# Patient Record
Sex: MALE | Race: WHITE
[De-identification: names, ages, dates, MRNs, and addresses within clinical notes are randomized per-mention and may not be internally consistent; named-entity substitution may affect disease eponyms.]

---

## 2018-04-14 ENCOUNTER — HOSPITAL ENCOUNTER (EMERGENCY)
Dept: HOSPITAL 75 - ER | Age: 48
Discharge: HOME | End: 2018-04-14
Payer: COMMERCIAL

## 2018-04-14 VITALS — BODY MASS INDEX: 40.09 KG/M2 | HEIGHT: 70 IN | WEIGHT: 280 LBS

## 2018-04-14 VITALS — DIASTOLIC BLOOD PRESSURE: 82 MMHG | SYSTOLIC BLOOD PRESSURE: 135 MMHG

## 2018-04-14 DIAGNOSIS — N20.1: Primary | ICD-10-CM

## 2018-04-14 LAB
ALBUMIN SERPL-MCNC: 4.2 GM/DL (ref 3.2–4.5)
ALP SERPL-CCNC: 56 U/L (ref 40–136)
ALT SERPL-CCNC: 45 U/L (ref 0–55)
APTT PPP: YELLOW S
BACTERIA #/AREA URNS HPF: NEGATIVE /HPF
BASOPHILS # BLD AUTO: 0 10^3/UL (ref 0–0.1)
BASOPHILS NFR BLD AUTO: 0 % (ref 0–10)
BILIRUB SERPL-MCNC: 0.5 MG/DL (ref 0.1–1)
BILIRUB UR QL STRIP: NEGATIVE
BUN/CREAT SERPL: 12
CALCIUM SERPL-MCNC: 9.5 MG/DL (ref 8.5–10.1)
CHLORIDE SERPL-SCNC: 108 MMOL/L (ref 98–107)
CO2 SERPL-SCNC: 22 MMOL/L (ref 21–32)
CREAT SERPL-MCNC: 1.28 MG/DL (ref 0.6–1.3)
EOSINOPHIL # BLD AUTO: 0 10^3/UL (ref 0–0.3)
EOSINOPHIL NFR BLD AUTO: 0 % (ref 0–10)
ERYTHROCYTE [DISTWIDTH] IN BLOOD BY AUTOMATED COUNT: 14 % (ref 10–14.5)
FIBRINOGEN PPP-MCNC: (no result) MG/DL
GFR SERPLBLD BASED ON 1.73 SQ M-ARVRAT: > 60 ML/MIN
GLUCOSE SERPL-MCNC: 134 MG/DL (ref 70–105)
GLUCOSE UR STRIP-MCNC: NEGATIVE MG/DL
HCT VFR BLD CALC: 46 % (ref 40–54)
HGB BLD-MCNC: 16.1 G/DL (ref 13.3–17.7)
KETONES UR QL STRIP: NEGATIVE
LEUKOCYTE ESTERASE UR QL STRIP: NEGATIVE
LYMPHOCYTES # BLD AUTO: 1.1 X 10^3 (ref 1–4)
LYMPHOCYTES NFR BLD AUTO: 10 % (ref 12–44)
MANUAL DIFFERENTIAL PERFORMED BLD QL: NO
MCH RBC QN AUTO: 30 PG (ref 25–34)
MCHC RBC AUTO-ENTMCNC: 35 G/DL (ref 32–36)
MCV RBC AUTO: 84 FL (ref 80–99)
MONOCYTES # BLD AUTO: 0.6 X 10^3 (ref 0–1)
MONOCYTES NFR BLD AUTO: 5 % (ref 0–12)
NEUTROPHILS # BLD AUTO: 9.3 X 10^3 (ref 1.8–7.8)
NEUTROPHILS NFR BLD AUTO: 84 % (ref 42–75)
NITRITE UR QL STRIP: NEGATIVE
PH UR STRIP: 7 [PH] (ref 5–9)
PLATELET # BLD: 221 10^3/UL (ref 130–400)
PMV BLD AUTO: 9.8 FL (ref 7.4–10.4)
POTASSIUM SERPL-SCNC: 4.2 MMOL/L (ref 3.6–5)
PROT SERPL-MCNC: 7.6 GM/DL (ref 6.4–8.2)
PROT UR QL STRIP: (no result)
RBC # BLD AUTO: 5.46 10^6/UL (ref 4.35–5.85)
RBC #/AREA URNS HPF: (no result) /HPF
SODIUM SERPL-SCNC: 140 MMOL/L (ref 135–145)
SP GR UR STRIP: 1.01 (ref 1.02–1.02)
SQUAMOUS #/AREA URNS HPF: (no result) /HPF
UROBILINOGEN UR-MCNC: NORMAL MG/DL
WBC # BLD AUTO: 11.1 10^3/UL (ref 4.3–11)
WBC #/AREA URNS HPF: (no result) /HPF

## 2018-04-14 PROCEDURE — 96375 TX/PRO/DX INJ NEW DRUG ADDON: CPT

## 2018-04-14 PROCEDURE — 74177 CT ABD & PELVIS W/CONTRAST: CPT

## 2018-04-14 PROCEDURE — 85025 COMPLETE CBC W/AUTO DIFF WBC: CPT

## 2018-04-14 PROCEDURE — 96374 THER/PROPH/DIAG INJ IV PUSH: CPT

## 2018-04-14 PROCEDURE — 80053 COMPREHEN METABOLIC PANEL: CPT

## 2018-04-14 PROCEDURE — 81000 URINALYSIS NONAUTO W/SCOPE: CPT

## 2018-04-14 PROCEDURE — 36415 COLL VENOUS BLD VENIPUNCTURE: CPT

## 2018-04-14 NOTE — DIAGNOSTIC IMAGING REPORT
PROCEDURE: CT abdomen and pelvis with contrast.



TECHNIQUE: Multiple contiguous axial images were obtained through

the abdomen and pelvis after administration of intravenous

contrast. 



DATE: 04/14/2018.



COMPARISON: None. 



INDICATION: 48-year-old male, left-sided abdominal pain.



FINDINGS: 



There is a 3 mm noncalcified left lower lobe pulmonary nodule on

axial image 14. The heart is not enlarged. There is no

pericardial effusion.



The liver is diffusely low in attenuation consistent with mild

diffuse fatty infiltration of the liver. The outer liver contours

are not grossly nodular. There is no identified liver lesion. The

main, right, and left portal veins are patent. The gallbladder is

unremarkable. There is no intrahepatic or extrahepatic bile duct

dilation. The main pancreatic duct is not abnormally dilated. The

pancreatic parenchyma is unremarkable. The spleen is normal in

size. The adrenal glands are unremarkable.



There is a questionable 3 mm low-attenuation left renal lesion on

axial image 37 which is too small to characterize. There is mild

asymmetric left perinephric stranding. There is a 2 mm stone in

the left distal ureter at the ureterovesical junction on axial

image 89. There is no hydronephrosis. Urinary bladder is

underdistended. The right urinary collecting system is not

distended.



There is diverticulosis without evidence of acute diverticulitis.

There is no free intraperitoneal air. No drainable fluid

collection. There is no free pelvic fluid. There is a

fat-containing paraumbilical hernia. No evidence of acute

appendicitis.



There are atherosclerotic calcifications. There is no identified

abnormally enlarged lymph node in the abdomen or pelvis which

meets CT size criteria for adenopathy.



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 There is

grade 1/2 anterolisthesis of L5 on S1. There is moderate disc

height loss at L5-S1. There are additional degenerative changes

of the spine.



IMPRESSION: 

CT ABDOMEN AND PELVIS.

1. 2 mm stone in the left distal ureter at the ureterovesical

junction without hydronephrosis.

2. Mild diffuse fatty infiltration of the liver.









Dictated by: 



  Dictated on workstation # [id number]

## 2018-04-14 NOTE — ED ABDOMINAL PAIN
General


Stated Complaint:  L SIDE ABD PAIN


Source of Information:  Patient


Exam Limitations:  No Limitations





History of Present Illness


Date Seen by Provider:  2018


Time Seen by Provider:  12:52


Initial Comments


To ER with c/o left lower abdominal pain that began this morning. Associated 

with nausea, no vomiting. No fevers or chills. No history of this. states pain 

began as "felt like I was going to have diarrhea" but he denies actually having 

diarrhea. no dysuria.


Timing/Duration:  1-2 Days


Severity/Quality:  Moderate


Radiation:  No Radiation


Activities at Onset:  None


Associated Symptoms:  Nausea/Vomiting





Allergies and Home Medications


Allergies


Coded Allergies:  


     No Known Drug Allergies (Unverified , 18)





Home Medications


Hydrocodone/Acetaminophen 1 Each Tablet, 1 EACH PO Q4H PRN for PAIN-SEVERE


   Prescribed by: CAL NIELSEN on 18 1258


Ondansetron 8 Mg Tab.rapdis, 8 MG PO Q4H PRN for NAUSEA/VOMITING-1ST LINE


   Prescribed by: CAL NIELSEN on 18 1258


Tamsulosin HCl 0.4 Mg Cap, 0.4 MG PO DAILY


   Prescribed by: CAL NIELSEN on 18 1320





Patient Home Medication List


Home Medication List Reviewed:  Yes





Review of Systems


Constitutional:  see HPI


EENTM:  No Symptoms Reported


Respiratory:  No Symptoms Reported


Cardiovascular:  No Symptoms Reported


Gastrointestinal:  See HPI, Abdominal Pain; Denies Constipated, Denies Diarrhea

; Nausea


Genitourinary:  No Symptoms Reported; Denies Burning, Denies Discharge, Denies 

Flank Pain, Denies Hematuria


Musculoskeletal:  no symptoms reported


Skin:  no symptoms reported


Psychiatric/Neurological:  No Symptoms Reported


Endocrine:  No Symptoms Reported





Past Medical-Social-Family Hx


Patient Social History


Recent Foreign Travel:  No


Contact w/Someone Who Travel:  No





Physical Exam


Vital Signs





Vital Signs - First Documented








 18





 12:40


 


Temp 98.8


 


Pulse 72


 


Resp 18


 


B/P (MAP) 148/84 (105)


 


Pulse Ox 95





Capillary Refill :


General Appearance:  WD/WN, no apparent distress, obese


HEENT:  PERRL/EOMI, normal ENT inspection


Neck:  non-tender, full range of motion


Respiratory:  no respiratory distress, no accessory muscle use


Cardiovascular:  regular rate, rhythm, no murmur


Gastrointestinal:  normal bowel sounds, non tender, soft; No abnormal bowel 

sounds, No guarding, No rebound, No tenderness


Extremities:  normal range of motion, non-tender


Neurologic/Psychiatric:  alert, normal mood/affect, oriented x 3


Skin:  normal color, warm/dry





Progress/Results/Core Measures


Lab Results





Laboratory Tests








Test


 18


12:45 18


13:20 Range/Units


 


 


White Blood Count


 11.1 H


 


 4.3-11.0


10^3/uL


 


Red Blood Count


 5.46 


 


 4.35-5.85


10^6/uL


 


Hemoglobin 16.1   13.3-17.7  G/DL


 


Hematocrit 46   40-54  %


 


Mean Corpuscular Volume 84   80-99  FL


 


Mean Corpuscular Hemoglobin 30   25-34  PG


 


Mean Corpuscular Hemoglobin


Concent 35 


 


 32-36  G/DL





 


Red Cell Distribution Width 14.0   10.0-14.5  %


 


Platelet Count


 221 


 


 130-400


10^3/uL


 


Mean Platelet Volume 9.8   7.4-10.4  FL


 


Neutrophils (%) (Auto) 84 H  42-75  %


 


Lymphocytes (%) (Auto) 10 L  12-44  %


 


Monocytes (%) (Auto) 5   0-12  %


 


Eosinophils (%) (Auto) 0   0-10  %


 


Basophils (%) (Auto) 0   0-10  %


 


Neutrophils # (Auto) 9.3 H  1.8-7.8  X 10^3


 


Lymphocytes # (Auto) 1.1   1.0-4.0  X 10^3


 


Monocytes # (Auto) 0.6   0.0-1.0  X 10^3


 


Eosinophils # (Auto)


 0.0 


 


 0.0-0.3


10^3/uL


 


Basophils # (Auto)


 0.0 


 


 0.0-0.1


10^3/uL


 


Sodium Level 140   135-145  MMOL/L


 


Potassium Level 4.2   3.6-5.0  MMOL/L


 


Chloride Level 108 H    MMOL/L


 


Carbon Dioxide Level 22   21-32  MMOL/L


 


Anion Gap 10   5-14  MMOL/L


 


Blood Urea Nitrogen 15   7-18  MG/DL


 


Creatinine


 1.28 


 


 0.60-1.30


MG/DL


 


Estimat Glomerular Filtration


Rate > 60 


 


  





 


BUN/Creatinine Ratio 12    


 


Glucose Level 134 H    MG/DL


 


Calcium Level 9.5   8.5-10.1  MG/DL


 


Total Bilirubin 0.5   0.1-1.0  MG/DL


 


Aspartate Amino Transf


(AST/SGOT) 29 


 


 5-34  U/L





 


Alanine Aminotransferase


(ALT/SGPT) 45 


 


 0-55  U/L





 


Alkaline Phosphatase 56     U/L


 


Total Protein 7.6   6.4-8.2  GM/DL


 


Albumin 4.2   3.2-4.5  GM/DL


 


Urine Color  YELLOW   


 


Urine Clarity


 


 SLIGHTLY


CLOUDY  





 


Urine pH  7  5-9  


 


Urine Specific Gravity  1.010 L 1.016-1.022  


 


Urine Protein  1+ H NEGATIVE  


 


Urine Glucose (UA)  NEGATIVE  NEGATIVE  


 


Urine Ketones  NEGATIVE  NEGATIVE  


 


Urine Nitrite  NEGATIVE  NEGATIVE  


 


Urine Bilirubin  NEGATIVE  NEGATIVE  


 


Urine Urobilinogen  NORMAL  NORMAL  MG/DL


 


Urine Leukocyte Esterase  NEGATIVE  NEGATIVE  


 


Urine RBC (Auto)  NEGATIVE  NEGATIVE  


 


Urine RBC  NONE   /HPF


 


Urine WBC  NONE   /HPF


 


Urine Squamous Epithelial


Cells 


 0-2 


  /HPF





 


Urine Crystals  NONE   /LPF


 


Urine Bacteria  NEGATIVE   /HPF


 


Urine Casts  NONE   /LPF


 


Urine Mucus  NEGATIVE   /LPF


 


Urine Culture Indicated  NO   








My Orders





Orders - CAL NIELSEN


Cbc With Automated Diff (18 12:50)


Comprehensive Metabolic Panel (18 12:50)


Ua Culture If Indicated (18 12:50)


Saline Lock/Iv-Start (18 12:50)


Ct Abdomen/Pelvis W (18 12:50)


Ketorolac Injection (Toradol Injection) (18 13:00)


Ondansetron Injection (Zofran Injectio (18 13:00)


Iohexol Injection (Omnipaque 350 Mg/Ml 1 (18 13:00)


Ns (Ivpb) (Sodium Chloride 0.9%) (18 13:00)


Pharmacy Communication (Pharmacy Communi (18 12:56)


Ondansetron Injection (Zofran Injectio (18 12:46)


Ketorolac Injection (Toradol Injection) (18 12:46)





Medications Given in ED





Current Medications








 Medications  Dose


 Ordered  Sig/Jimmy


 Route  Start Time


 Stop Time Status Last Admin


Dose Admin


 


 Iohexol  100 ml  ONCE  ONCE


 IV  18 13:00


 18 13:01 DC 18 13:17


100 ML


 


 Ketorolac


 Tromethamine  30 mg  ONCE  ONCE


 IVP  18 13:00


 18 13:01 DC 4/14/18 12:53


30 MG


 


 Ondansetron HCl  8 mg  ONCE  ONCE


 IVP  18 13:00


 18 13:01 DC 18 12:53


8 MG


 


 Sodium Chloride  250 ml  ONCE  ONCE


 IV  18 13:00


 18 13:01 DC 18 13:17


80 ML








Vital Signs/I&O











 18





 12:40 14:07


 


Temp 98.8 


 


Pulse 72 99


 


Resp 18 18


 


B/P (MAP) 148/84 (105) 135/82 (105)


 


Pulse Ox 95 95











   Diagonstic Imaging:  CT


Comments


NAME:   MAKAYLA HANNA


[facility] REC#:   [id number]


ACCOUNT#:   [id number]


PT STATUS:   REG ER


:   1970


PHYSICIAN:   CAL NIELSEN APRLOAN


ADMIT DATE:   18/ER


 ***Draft***


Date of Exam:18





CT ABDOMEN/PELVIS W








PROCEDURE: CT abdomen and pelvis with contrast.





TECHNIQUE: Multiple contiguous axial images were obtained through


the abdomen and pelvis after administration of intravenous


contrast. 





DATE: 2018.





COMPARISON: None. 





INDICATION: 48-year-old male, left-sided abdominal pain.





FINDINGS: 





There is a 3 mm noncalcified left lower lobe pulmonary nodule on


axial image 14. The heart is not enlarged. There is no


pericardial effusion.





The liver is diffusely low in attenuation consistent with mild


diffuse fatty infiltration of the liver. The outer liver contours


are not grossly nodular. There is no identified liver lesion. The


main, right, and left portal veins are patent. The gallbladder is


unremarkable. There is no intrahepatic or extrahepatic bile duct


dilation. The main pancreatic duct is not abnormally dilated. The


pancreatic parenchyma is unremarkable. The spleen is normal in


size. The adrenal glands are unremarkable.





There is a questionable 3 mm low-attenuation left renal lesion on


axial image 37 which is too small to characterize. There is mild


asymmetric left perinephric stranding. There is a 2 mm stone in


the left distal ureter at the ureterovesical junction on axial


image 89. There is no hydronephrosis. Urinary bladder is


underdistended. The right urinary collecting system is not


distended.





There is diverticulosis without evidence of acute diverticulitis.


There is no free intraperitoneal air. No drainable fluid


collection. There is no free pelvic fluid. There is a


fat-containing paraumbilical hernia. No evidence of acute


appendicitis.





There are atherosclerotic calcifications. There is no identified


abnormally enlarged lymph node in the abdomen or pelvis which


meets CT size criteria for adenopathy.





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 There is


grade 1/2 anterolisthesis of L5 on S1. There is moderate disc


height loss at L5-S1. There are additional degenerative changes


of the spine.





IMPRESSION: 


CT ABDOMEN AND PELVIS.


1. 2 mm stone in the left distal ureter at the ureterovesical


junction without hydronephrosis.


2. Mild diffuse fatty infiltration of the liver.

















  Dictated on workstation # [id number]








Dict:   18 1327


Trans:   18 1344


[facility] [id number]





Interpreted by:     KAROLINA JULIEN MD


Electronically signed by:





Departure


Impression





 Primary Impression:  


 Left ureteral stone


Disposition:  01 HOME, SELF-CARE


Condition:  Stable





Departure-Patient Inst.


Decision time for Depature:  12:55


Referrals:  


PETE CRUZ (PCP/Family)


Primary Care Physician


Patient Instructions:  Kidney Stones in Adults





Add. Discharge Instructions:  


1. Return to ER for any fevers, chills, or worsening pain


2. Nausea medication as needed, pain medication as needed (you may 

alternatively just use tylenol and motrin), and antibiotics as directed


3. See your doctor next week for recheck


Scripts


Tamsulosin HCl (Flomax) 0.4 Mg Cap


0.4 MG PO DAILY, #14 CAP


   Prov: CAL NIELSEN         18 


Hydrocodone/Acetaminophen (Norco 5-325 Tablet) 1 Each Tablet


1 EACH PO Q4H PRN for PAIN-SEVERE, #14 TAB


   Prov: CAL NIELSEN         18 


Ondansetron (Zofran Odt) 8 Mg Tab.rapdis


8 MG PO Q4H PRN for NAUSEA/VOMITING-1ST LINE, #10 TAB


   Prov: CAL NIELSEN         18











CAL NIELSEN 2018 12:57